# Patient Record
Sex: MALE | Race: OTHER | Employment: FULL TIME | URBAN - METROPOLITAN AREA
[De-identification: names, ages, dates, MRNs, and addresses within clinical notes are randomized per-mention and may not be internally consistent; named-entity substitution may affect disease eponyms.]

---

## 2017-02-23 ENCOUNTER — HOSPITAL ENCOUNTER (OUTPATIENT)
Dept: PHYSICAL THERAPY | Age: 37
Discharge: HOME | End: 2017-02-23
Payer: COMMERCIAL

## 2017-02-23 PROCEDURE — 97162 PT EVAL MOD COMPLEX 30 MIN: CPT | Performed by: PHYSICAL MEDICINE & REHABILITATION

## 2017-02-23 PROCEDURE — 97140 MANUAL THERAPY 1/> REGIONS: CPT | Performed by: PHYSICAL MEDICINE & REHABILITATION

## 2017-02-23 PROCEDURE — 97530 THERAPEUTIC ACTIVITIES: CPT | Performed by: PHYSICAL MEDICINE & REHABILITATION

## 2017-02-23 NOTE — PROGRESS NOTES
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 In  he developed chronic epidymidis following a variocele surgery, symptoms would wax and wane in severity over years. In  the testicular pain became constant. In  he began experiencing pelvic pain, penile pain and urinary symptoms. He has undergone urodynamic studies which he describes as \"the problem is not in the bladder but in the outlet at the sphincter and urethra\". He has been treated with several courses of antibiotics for prostatitis with no relief. He has tried Flomax with no relief, tibial nerve stimulation with no relief, he is considering sacral nerve stimulator implantation but wants to try physical therapy prior to making that decision. He is taking gabapentin with some relief. He uses cold compresses when pain is severe. He is seeing a chiropractor regularly for \"alignments\" but denies back pain. He reports he urinates once every 2-3 hours during the day, consistently once every 2 hours at night which is severely disruptive to his sleep.   The urge to urinate is very strong and will become painful at the bladder if he does not go immediately, once he urinates the bladder pain is relieved immediately, he has pain in the urethra during and after urination that is slow to resolve. He has intermittent, frequent pelvic floor and perineal pain that moves up into his lower abdomen. He describes intermittant R sided testicular pain and R upper thigh parasthesias and pain. He does not notice increased pain with valsalva or sit ups, he does feel his pain limits his ability to be physically active and exercise. Pain ranges from 0-7 VAS. PMH:   IBS (self diagnosed), has 3-4 bowel movements daily, level 6 has strong urge but no incontinence. HTN- meds  High cholsterol - meds  No LBP, no back, hip, LE surgery. Drinks european coffee 2-3x daily, has cut down but has noticed no difference. Notices no difference in urinary frequency with alcohol. Does not consume spicy, acidic foods. Color of his urine is dark yellow. PLOF: Chronic pain  Mechanism of Injury: Chronic  Previous Treatment/Compliance: Has not tried pelvic floor PT  PMHx/Surgical Hx: See above  Pt Goals: reduce pain   Barriers: Chronicity   Motivation: Good   Substance use: None reported    FABQ Score: 80  Cognition: A & O x 4       OBJECTIVE/EXAMINATION    Musculoskeletal:   L IC higher in standing, R shoulder higher (+) scoliosis    Trunk ROM:   Flexion:  Fingertips to knee joint line - stiff through lumbar spine, hamstrings  Extension:  WNL  Sidebending:  Stiff to L sidebending, Fingertips to knee joint line bilaterally. Tight through B hamstrings  L > R, piriformis, hip flexors, ITB's Globally high ms tone with poor flexibility. Pelvic Exam:   Pelvic floor musculature and associated hip/pelvic ms attachments non tender to palpation externally. Unable to assess levator, puborectalis, pelvic floor musculature internally due to very tight, reactive EAS. Multiple attempts to relax unsuccessful.   Unable to reproduce his chief complaint pain with muscular palpation externally however it was noted he has very high tone through EAS and pelvic floor, hips.    Further pelvic floor assessment indicated. 30 min Therapeutic Activity:  []  See flow sheet :  Discussed pelvic floor down training techniques, self trigger point release options, role and purpose of biofeedback assisted PFM quieting exercises. Discussed healthy bowel habits with recommendations to add soluble fiber (Bran Buds) and probiotics (Activia) to improve his stool quality from Level 6 to Level 4. Discussed bladder irritants and friends, recommended increased hydration to decrease the concentration of urine in the bladder, decrease caffeine intake. Discussed the importance of regular intentional nervous system quieting activities such as yoga, meditation, massage. Rationale: increase ROM and improve coordination  to improve the patients ability to decrease urinary frequency and pain. 15 min Manual Therapy:  Manual cues for pelvic floor bulging technique   Rationale: decrease pain, increase ROM and increase tissue extensibility  to improve the patients ability to tolerate pelvic floor exam.            With   [] TE   [x] TA   [] neuro   [] other: Patient Education: [x] Review HEP    [] Progressed/Changed HEP based on:   [] positioning   [] body mechanics   [] transfers   [] heat/ice application    [x] other: See above descripton       Other Objective/Functional Measures:  Further pelvic floor assessment is needed    Pain Level (0-10 scale) post treatment:  0/10      ASSESSMENT:   Pt presents with a long history of pelvic floor pain, urinary dysfunction, nocturia, penile pain. He is seeking pelvic floor PT in the hopes that working with the pelvic floor musculature will reduce his pain and improve his urinary symptoms. Today he presented with overall high muscular tone, very high pelvic ms tone to the point where complete exam was not possible. I certainly think we can help him with this.   I was not able to reproduce his chief complaint pain with the exam today however further exam is indicated. Of concern is his long history of very loose stools, 3-4 per day. We discussed that it is very difficult to hold watery stool, and this may be placing increased chronic demand on his pelvic floor contributing to his high tone. I have provided him some basic dietary recommendations for bulking his stool with the goal of getting him to a level 4 on the [facility] Stool Chart, if we see no results with these recommendations he may need further assessment by GI or dietary. We discussed the importance of intentional stress reduction activities to decrease muscle tone and quiet the nervous system. I am anxious to start a biofeedback assisted pelvic floor down training activity to see how he responds. Our plan is to continue pelvic floor PT 1-2 x per week for 4-6 weeks. Mr Steven Garcia will benefit from skilled PT services to address his functional limitations and achieve his functional goals as stated on the plan of care.          [x]  See Plan of James Garcia, PT, MSPT 2/23/2017  2:08 PM

## 2017-02-24 NOTE — PROGRESS NOTES
[address] [facility] Marilu Chin Breezy Cowanaide Hernández  Phone: [phone]  Fax: [phone]    Plan of Care/ Statement of Necessity for Physical Therapy Services 2-15    Patient name: Sachin Ramachandran  : 1980  Provider#: [id number]  Referral source: Yolette Dubon MD      Medical/Treatment Diagnosis: Pelvic floor dysfunction [N81.84]     Prior Hospitalization: see medical history     Comorbidities: HTN, High chol, pelvic pain  Prior Level of Function: Chronic  Medications: Verified on Patient Summary List    Start of Care: 17      Onset Date:       The [address] [place] [address] and following information is based on the information from the initial evaluation. 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 In  he developed chronic epidymidis following a variocele surgery, symptoms would wax and wane in severity over years. In  the testicular pain became constant. In  he began experiencing pelvic pain, penile pain and urinary symptoms. He has undergone urodynamic studies which he describes as \"the problem is not in the bladder but in the outlet at the sphincter and urethra\". He has been treated with several courses of antibiotics for prostatitis with no relief. He has tried Flomax with no relief, tibial nerve stimulation with no relief, he is considering sacral nerve stimulator implantation but wants to try physical therapy prior to making that decision. He is taking gabapentin with some relief. He uses cold compresses when pain is severe. He is seeing a chiropractor regularly for \"alignments\" but denies back pain. He reports he urinates once every 2-3 hours during the day, consistently once every 2 hours at night which is severely disruptive to his sleep.  The urge to urinate is very strong and will become painful at the bladder if he does not go immediately, once he urinates the bladder pain is relieved immediately, he has pain in the urethra during and after urination that is slow to resolve. He has intermittent, frequent pelvic floor and perineal pain that moves up into his lower abdomen. He describes intermittant R sided testicular pain and R upper thigh parasthesias and pain. He does not notice increased pain with valsalva or sit ups, he does feel his pain limits his ability to be physically active and exercise. Pain ranges from 0-7 VAS. Pt presents with a long history of pelvic floor pain, urinary dysfunction, nocturia, penile pain. He is seeking pelvic floor PT in the hopes that working with the pelvic floor musculature will reduce his pain and improve his urinary symptoms. Today he presented with overall high muscular tone, very high pelvic ms tone to the point where complete exam was not possible. I certainly think we can help him with this. I was not able to reproduce his chief complaint pain with the exam today however further exam is indicated. Of concern is his long history of very loose stools, 3-4 per day. We discussed that it is very difficult to hold watery stool, and this may be placing increased chronic demand on his pelvic floor contributing to his high tone. I have provided him some basic dietary recommendations for bulking his stool with the goal of getting him to a level 4 on the [facility] Stool Chart, if we see no results with these recommendations he may need further assessment by GI or dietary. We discussed the importance of intentional stress reduction activities to decrease muscle tone and quiet the nervous system. I am anxious to start a biofeedback assisted pelvic floor down training activity to see how he responds. Our plan is to continue pelvic floor PT 1-2 x per week for 4-6 weeks.       Evaluation Complexity History HIGH Complexity :3+ comorbidities / personal factors will impact the outcome/ POC ; Examination MEDIUM Complexity : 3 Standardized tests and measures addressing body structure, function, activity limitation and / or participation in recreation  ;Presentation MEDIUM Complexity : Evolving with changing characteristics  ; Clinical Decision Making MEDIUM Complexity : FOTO score of 26-74  Overall Complexity Rating: MEDIUM    Problem List: pain affecting function, decrease ROM, decrease ADL/ functional abilitiies, decrease activity tolerance and decrease flexibility/ joint mobility   Treatment Plan may include any combination of the following: Therapeutic exercise, Therapeutic activities, Neuromuscular re-education, Physical agent/modality, Manual therapy, Patient education and Self Care training  Patient / Family readiness to learn indicated by: asking questions  Persons(s) to be included in education: patient (P)  Barriers to Learning/Limitations: None  Patient Goal (s): no pain with activities, sleep a full night  Patient Self Reported Health Status: good  Rehabilitation Potential: good    Short Term Goals: To be accomplished in 6 weeks:  Patient will be independent with a progressive home exercise program  Patient will report improved stool quality from Level 6 to Level 4 in order to decrease demand on pelvic floor  Patient will tolerate full pelvic floor exam.     Long Term Goals: To be accomplished in 12 weeks:  Patient will have no pain with urination  Patient will have no radicular pain into R LE  Patient will decrease night urination to 2x per night  Further goals to be developed upon completion of pelvic exam     Frequency / Duration: Patient to be seen 1-2 times per week for 6-12 weeks.     Patient/ Caregiver education and instruction: self care and activity modification    [x]  Plan of care has been reviewed with CECE Goins PT, MSPT   2/24/2017 11:04 AM    ________________________________________________________________________    I certify that the above Therapy Services are being furnished while the patient is under my care. I agree with the treatment plan and certify that this therapy is necessary.     [de-identified] Signature:____________________  Date:____________Time: _________

## 2017-03-09 ENCOUNTER — HOSPITAL ENCOUNTER (OUTPATIENT)
Dept: PHYSICAL THERAPY | Age: 37
Discharge: HOME | End: 2017-03-09
Payer: COMMERCIAL

## 2017-03-09 PROCEDURE — 97110 THERAPEUTIC EXERCISES: CPT | Performed by: PHYSICAL MEDICINE & REHABILITATION

## 2017-03-09 PROCEDURE — 97140 MANUAL THERAPY 1/> REGIONS: CPT | Performed by: PHYSICAL MEDICINE & REHABILITATION

## 2017-03-09 PROCEDURE — 97112 NEUROMUSCULAR REEDUCATION: CPT | Performed by: PHYSICAL MEDICINE & REHABILITATION

## 2017-03-09 NOTE — PROGRESS NOTES
PT DAILY TREATMENT NOTE 2-15    Patient Name: Jessi Bui  Date:3/9/2017  : 1980  [x]  Patient  Verified  Payor: BLUE CROSS / Plan: [facility] PPO / Product Type: PPO /    In time: 10:00   Out time: 11:00  Total Treatment Time (min): 60  Visit #: 2     Treatment Area: Muscle weakness (generalized) [M62.81]    SUBJECTIVE  Pain Level (0-10 scale):  3/10  Any medication changes, allergies to medications, adverse drug reactions, diagnosis change, or new procedure performed?: [x] No    [] Yes (see summary sheet for update)  Subjective functional status/changes:   [] No changes reported  Has been using Bran Buds and Activia with improvement in stool quality, noticing less liquid stool. Still having BM 3x a day but with less urgency, better predictibility. He feels this is an improvement. OBJECTIVE    Tight through EAS/IAS, difficulty relaxing. High tone through levator ani bilaterally, multiple trigger points bilaterally L > R.    Chief complaint testicular and abdominal pain not reproduced with palpation to levator ani ms or area of pudendal nerve. PERFECT SCORE CHART  P =  Power (Laycock Scale Grade 0-5)  4/5  E =  Endurance (How long can pt hold max contraction)  6 seconds  R =  Repetitions (How many times the pt can repeat the holds) 5 reps  F =  Fast Twitch (Number of 1 second contractions in 10 seconds) 6 reps  E =  Elevation (Lifting of post vag wall toward pubic bone) Present/Absent   NA  C =  Co-Contraction of transverse abdominus Present/Absent  NT  T = Timing (squeeze and lift with cough) Present/Absent  NT    30 min Therapeutic Exercise:  [x] See flow sheet :   Rationale: increase ROM, improve coordination and increase proprioception to improve the patients ability to decrease urinary urge/frequency, decrease pelvic pain with ADLs. 15 min Neuromuscular Re-education:  []  See flow sheet :  Manual cues for  PFM and puborectalis downtraining techniques.      Rationale: increase ROM, improve coordination and increase proprioception  to improve the patients ability to actively downtrain PFM for pain relief, improved urinary function. 15 min Manual Therapy:  STM/TPR B levator ani group. 60-90 second holds. Rationale: decrease pain, increase tissue extensibility and decrease trigger points  to improve the patients ability to decrease pelvic pain with ADLs. With   [x] TE   [] TA   [x] neuro   [] other: Patient Education: [x] Review HEP    [x] Progressed/Changed HEP based on:   [] positioning   [] body mechanics   [] transfers   [] heat/ice application    [x] other: HEP provided, see copy in chart. Provided Therawand use and ordering information. Other Objective/Functional Measures: Demonstrated improved PFM resting tone post treatment today. Pain Level (0-10 scale) post treatment: 0/10    ASSESSMENT/Changes in Function:   High tone noted through pelvic floor, high tone at EAS/IAS. Unable to reproduce his chief complaint with palpation to pelvic floor. Vocalized good understanding of use of therawand including self trigger point release. Demonstrated independence with pelvic floor and hip stretching exercises. He plans to begin yoga practice for intentional relaxation, pelvic floor down training. Patient will continue to benefit from skilled PT services to modify and progress therapeutic interventions, address functional mobility deficits, address ROM deficits, address strength deficits, analyze and address soft tissue restrictions, analyze and cue movement patterns, analyze and modify body mechanics/ergonomics and assess and modify postural abnormalities to attain remaining goals. [x]  See Plan of Care  []  See progress note/recertification  []  See Discharge Summary         Progress towards goals / Updated goals:  Able to advance exercises today with good tolerance.   Pt continues to need instruction for correct exercise form and performance. Continues to demonstrate good potential to achieve functional goals stated on the plan of care. PLAN  [x]  Upgrade activities as tolerated     [x]  Continue plan of care  []  Update interventions per flow sheet       []  Discharge due to:_  [x]  Other: Biofeedback assisted PFM downtraining next visit.         Sanjuana Cueto, PT 3/9/2017  10:08 AM

## 2017-03-15 ENCOUNTER — HOSPITAL ENCOUNTER (OUTPATIENT)
Dept: PHYSICAL THERAPY | Age: 37
Discharge: HOME | End: 2017-03-15
Payer: COMMERCIAL

## 2017-03-15 PROCEDURE — 97112 NEUROMUSCULAR REEDUCATION: CPT | Performed by: PHYSICAL MEDICINE & REHABILITATION

## 2017-03-15 PROCEDURE — 97110 THERAPEUTIC EXERCISES: CPT | Performed by: PHYSICAL MEDICINE & REHABILITATION

## 2017-03-15 PROCEDURE — 97530 THERAPEUTIC ACTIVITIES: CPT | Performed by: PHYSICAL MEDICINE & REHABILITATION

## 2017-03-15 NOTE — PROGRESS NOTES
PT DAILY TREATMENT NOTE 2-15    Patient Name: Rodney Zuluaga  Date:3/15/2017  : 1980  [x]  Patient  Verified  Payor: BLUE ARCELIA / Plan: Steve Hollingsworth [id number] PPO / Product Type: PPO /    In time: 10:00  Out time: 11:00  Total Treatment Time (min): 60  Visit #: 3     Treatment Area: Muscle weakness (generalized) [M62.81]    SUBJECTIVE  Pain Level (0-10 scale): 3/10  Any medication changes, allergies to medications, adverse drug reactions, diagnosis change, or new procedure performed?: [x] No    [] Yes (see summary sheet for update)  Subjective functional status/changes:   [] No changes reported  Ordered therawand - waiting for it to arrive. He has noticed some decrease in pain over the past 3 days, possibly due to the stretching exercises  He is stretching every day. OBJECTIVE    15 min Therapeutic Exercise:  [] See flow sheet :  Pelvic floor, hip stretching   Rationale: increase ROM and improve coordination to improve the patients ability to decrease pelvic pain    15 min Therapeutic Activity:  []  See flow sheet :  Progressive relaxation, diaphragmatic breathing, pelvic floor down training activities. Rationale: improve coordination and increase proprioception  to improve the patients ability to decrease pelvic pain     30 min Neuromuscular Re-education:  []  See flow sheet : sEMG Biofeedback with rectal sensor for pelvic floor downtraining exercises. Incorporated diaphragmatic breathing, active pelvic floor bulging, contract relax and elevators.       Rationale: improve coordination, increase proprioception and decrease pain  to improve the patients ability to decrease pelvic pain and urinary hesitancy symptoms with ADLs             With   [x] TE   [x] TA   [] neuro   [] other: Patient Education: [x] Review HEP    [] Progressed/Changed HEP based on:   [] positioning   [] body mechanics   [] transfers   [] heat/ice application    [] other:      Other Objective/Functional Measures: See sEMG readout in chart. Measured 10W/10R. Averaged 5.9mV at rest.      Pain Level (0-10 scale) post treatment: 0/10      ASSESSMENT/Changes in Function:     Patient will continue to benefit from skilled PT services to modify and progress therapeutic interventions, address functional mobility deficits, address ROM deficits, address strength deficits, analyze and address soft tissue restrictions, analyze and cue movement patterns and analyze and modify body mechanics/ergonomics to attain remaining goals. [x]  See Plan of Care  []  See progress note/recertification  []  See Discharge Summary         Progress towards goals / Updated goals:  Able to advance exercises today with good tolerance. Pt continues to need instruction for correct exercise form and performance. Continues to demonstrate good potential to achieve functional goals stated on the plan of care.       PLAN  [x]  Upgrade activities as tolerated     []  Continue plan of care  []  Update interventions per flow sheet       []  Discharge due to:_  []  Other:_      Marko Verde PT, MSPT 3/15/2017  1:11 PM

## 2017-03-20 ENCOUNTER — HOSPITAL ENCOUNTER (OUTPATIENT)
Dept: PHYSICAL THERAPY | Age: 37
Discharge: HOME | End: 2017-03-20
Payer: COMMERCIAL

## 2017-03-20 PROCEDURE — 97014 ELECTRIC STIMULATION THERAPY: CPT | Performed by: PHYSICAL MEDICINE & REHABILITATION

## 2017-03-20 PROCEDURE — 97530 THERAPEUTIC ACTIVITIES: CPT | Performed by: PHYSICAL MEDICINE & REHABILITATION

## 2017-03-20 PROCEDURE — 97140 MANUAL THERAPY 1/> REGIONS: CPT | Performed by: PHYSICAL MEDICINE & REHABILITATION

## 2017-03-20 NOTE — PROGRESS NOTES
PT DAILY TREATMENT NOTE 2-15    Patient Name: Maddy Sims  Date:3/20/2017  : 1980  [x]  Patient  Verified  Payor: Jose Kumaret / Plan: Steve Hollingsworth [id number] PPO / Product Type: PPO /    In time: 1:30     Out time: 2:30     Total Treatment Time (min): 60  Visit #: 4     Treatment Area: Muscle weakness (generalized) [M62.81]    SUBJECTIVE  Pain Level (0-10 scale):  5/10  Any medication changes, allergies to medications, adverse drug reactions, diagnosis change, or new procedure performed?: [x] No    [] Yes (see summary sheet for update)  Subjective functional status/changes:   [] No changes reported  Symptoms are worse today, not clear why. He is having pelvic pain and diffuse radiating pain into B post and ant thighs. OBJECTIVE    Modality rationale: decrease pain and increase tissue extensibility to improve the patients ability to decrease pain with ADLs   Min Type Additional Details   15 [] Estim: []Att   [x]Unatt        []TENS instruct                  [x]IFC  []Premod   []NMES                     []Other:  []w/US   []w/ice   [x]w/heat  Position: supine hook lying  Location: pudendal nerve     []  Traction: [] Cervical       []Lumbar                       [] Prone          []Supine                       []Intermittent   []Continuous Lbs:  [] before manual  [] after manual  []w/heat    []  Ultrasound: []Continuous   [] Pulsed at:                            []1MHz   []3MHz Location:  W/cm2:    []  Paraffin         Location:  []w/heat    []  Ice     []  Heat  []  Ice massage Position:  Location:    []  Laser  []  Other: Position:  Location:    []  Vasopneumatic Device Pressure:       [] lo [] med [] hi   Temperature:    [x] Skin assessment post-treatment:  [x]intact []redness- no adverse reaction    []redness  adverse reaction:       15 min Therapeutic Activity:  []  See flow sheet : Therawand instruction/ TPR instruction. TENS discussion.    Rationale: increase ROM and improve pain control  to improve the patients ability to perform ADLs without pain    30 min Manual Therapy:  TPR to levator ani bilaterally, theils massage, pudental nerve release bilaterally    Rationale: decrease pain, increase ROM, increase tissue extensibility and decrease trigger points  to improve the patients ability to decrease pain      With   [] TE   [x] TA   [] neuro   [] other: Patient Education: [x] Review HEP    [] Progressed/Changed HEP based on:   [] positioning   [] body mechanics   [] transfers   [] heat/ice application    [] other:      Other Objective/Functional Measures: Improved tolerance to levator TPR     Pain Level (0-10 scale) post treatment: 4/10    ASSESSMENT/Changes in Function:   Considering TENS for home use for pain control. Patient will continue to benefit from skilled PT services to modify and progress therapeutic interventions, address functional mobility deficits, address ROM deficits, address strength deficits, analyze and address soft tissue restrictions, analyze and cue movement patterns and analyze and modify body mechanics/ergonomics to attain remaining goals. [x]  See Plan of Care  []  See progress note/recertification  []  See Discharge Summary         Progress towards goals / Updated goals:  Able to advance exercises today with good tolerance. Pt continues to need instruction for correct exercise form and performance. Continues to demonstrate good potential to achieve functional goals stated on the plan of care.       PLAN  [x]  Upgrade activities as tolerated     []  Continue plan of care  []  Update interventions per flow sheet       []  Discharge due to:_  []  Other:_      Jaclyn Ochoa PT, MSPT 3/20/2017  3:30 PM

## 2017-03-22 ENCOUNTER — APPOINTMENT (OUTPATIENT)
Dept: PHYSICAL THERAPY | Age: 37
End: 2017-03-22
Payer: COMMERCIAL

## 2017-03-27 ENCOUNTER — HOSPITAL ENCOUNTER (OUTPATIENT)
Dept: PHYSICAL THERAPY | Age: 37
Discharge: HOME | End: 2017-03-27
Payer: COMMERCIAL

## 2017-03-27 PROCEDURE — 97530 THERAPEUTIC ACTIVITIES: CPT | Performed by: PHYSICAL MEDICINE & REHABILITATION

## 2017-03-27 PROCEDURE — 97014 ELECTRIC STIMULATION THERAPY: CPT | Performed by: PHYSICAL MEDICINE & REHABILITATION

## 2017-03-27 NOTE — PROGRESS NOTES
PT DAILY TREATMENT NOTE 2-15    Patient Name: Mesha Sanderson  Date:3/27/2017  : 1980  [x]  Patient  Verified  Payor: BLUE CROSS / Plan: [employer] Hollings[employer] [id number] PPO / Product Type: PPO /    In time: 1:30  Out time: 2:30  Total Treatment Time (min): 60  Visit #: 5    Treatment Area: Muscle weakness (generalized) [M62.81]    SUBJECTIVE  Pain Level (0-10 scale): 4/10  Any medication changes, allergies to medications, adverse drug reactions, diagnosis change, or new procedure performed?: [x] No    [] Yes (see summary sheet for update)  Subjective functional status/changes:   [] No changes reported  Urinary frequency unchanged, still struggling with flow. Pain remains elevated.      OBJECTIVE    Modality rationale: decrease pain to improve the patients ability to decrease pelvic pain and frequency   Min Type Additional Details   45 [x] Estim: []Att   []Unatt        [x]TENS instruct                  []IFC  []Premod   []NMES                     [x]Other: Rectal TENS for pain relief  []w/US   []w/ice   []w/heat  Position: sidelying  Location: rectal    12.5 frequency continuous    []  Traction: [] Cervical       []Lumbar                       [] Prone          []Supine                       []Intermittent   []Continuous Lbs:  [] before manual  [] after manual  []w/heat    []  Ultrasound: []Continuous   [] Pulsed at:                            []1MHz   []3MHz Location:  W/cm2:    []  Paraffin         Location:  []w/heat    []  Ice     []  Heat  []  Ice massage Position:  Location:    []  Laser  []  Other: Position:  Location:    []  Vasopneumatic Device Pressure:       [] lo [] med [] hi   Temperature:    [x] Skin assessment post-treatment:  [x]intact []redness- no adverse reaction    []redness  adverse reaction:     15 min Therapeutic Activity:  []  See flow sheet :   Rationale: decrease pelvic pain  to improve the patients ability to decrease pain with urination       With   [] TE   [x] TA   [] neuro   [] other: Patient Education: [x] Review HEP    [] Progressed/Changed HEP based on:   [] positioning   [] body mechanics   [] transfers   [] heat/ice application    [x] other:  TENS trial in clinic. Other Objective/Functional Measures:  Poor tolerance to settings > 12.5 frequency     Pain Level (0-10 scale) post treatment: 2/10    ASSESSMENT/Changes in Function:    No significant improvement in the clinic with TENS today, multiple frequencies attempted. Will see back in 2 days and reassess. Patient will continue to benefit from skilled PT services to modify and progress therapeutic interventions, address functional mobility deficits, address ROM deficits, address strength deficits, analyze and address soft tissue restrictions, analyze and cue movement patterns and analyze and modify body mechanics/ergonomics to attain remaining goals.      [x]  See Plan of Care  []  See progress note/recertification  []  See Discharge Summary         Progress towards goals / Updated goals:  Limited progress    PLAN  [x]  Upgrade activities as tolerated     []  Continue plan of care  []  Update interventions per flow sheet       []  Discharge due to:_  []  Other:_      Derick Quinn PT, MSPT 3/27/2017  3:30 PM

## 2017-03-29 ENCOUNTER — HOSPITAL ENCOUNTER (OUTPATIENT)
Dept: PHYSICAL THERAPY | Age: 37
Discharge: HOME | End: 2017-03-29
Payer: COMMERCIAL

## 2017-03-29 PROCEDURE — 97112 NEUROMUSCULAR REEDUCATION: CPT | Performed by: PHYSICAL MEDICINE & REHABILITATION

## 2017-03-29 PROCEDURE — 97110 THERAPEUTIC EXERCISES: CPT | Performed by: PHYSICAL MEDICINE & REHABILITATION

## 2017-03-29 NOTE — PROGRESS NOTES
PT DAILY TREATMENT NOTE 2-15    Patient Name: Dominick Lopez  Date:3/29/2017  : 1980  [x]  Patient  Verified  Payor: BLUE CROSS / Plan: [employer]ings[employer] [id number] PPO / Product Type: PPO /    In time: 1:30    Out time: 2:30  Total Treatment Time (min): 60  Visit #: 6    Treatment Area: Muscle weakness (generalized) [M62.81]    SUBJECTIVE  Pain Level (0-10 scale): 4/10  Any medication changes, allergies to medications, adverse drug reactions, diagnosis change, or new procedure performed?: [x] No    [] Yes (see summary sheet for update)  Subjective functional status/changes:   [] No changes reported  Pain is unchanged, frequency is unchanged. Has to get up every 2 hours to urinate at night which is severely limiting his sleep.        OBJECTIVE    Modality rationale: decrease pain and increase tissue extensibility to improve the patients ability to decrease pelvic pain   Min Type Additional Details   45 [x] Estim: []Att   []Unatt        []TENS instruct                  []IFC  []Premod   []NMES                     [x]Other:  TENS assessment, instruction []w/US   []w/ice   []w/heat  Position: sidelying  Location: rectal    []  Traction: [] Cervical       []Lumbar                       [] Prone          []Supine                       []Intermittent   []Continuous Lbs:  [] before manual  [] after manual  []w/heat    []  Ultrasound: []Continuous   [] Pulsed at:                            []1MHz   []3MHz Location:  W/cm2:    []  Paraffin         Location:  []w/heat    []  Ice     []  Heat  []  Ice massage Position:  Location:    []  Laser  []  Other: Position:  Location:    []  Vasopneumatic Device Pressure:       [] lo [] med [] hi   Temperature:    [x] Skin assessment post-treatment:  [x]intact []redness- no adverse reaction    []redness  adverse reaction:       15 min Therapeutic Activity:  [x]  See flow sheet :  PFM downtraining   Rationale: increase ROM and promote PFM relaxation  to improve the patients ability to decrease PFM pain             With   [] TE   [x] TA   [] neuro   [] other: Patient Education: [x] Review HEP    [] Progressed/Changed HEP based on:   [] positioning   [] body mechanics   [] transfers   [] heat/ice application    [x] other:  TENS assessment     Other Objective/Functional Measures: --     Pain Level (0-10 scale) post treatment: 3/10    ASSESSMENT/Changes in Function:     Patient will continue to benefit from skilled PT services to modify and progress therapeutic interventions, address functional mobility deficits, analyze and address soft tissue restrictions, analyze and cue movement patterns, analyze and modify body mechanics/ergonomics and assess and modify postural abnormalities to attain remaining goals. [x]  See Plan of Care  []  See progress note/recertification  []  See Discharge Summary         Progress towards goals / Updated goals:  Able to advance exercises today with good tolerance. Pt continues to need instruction for correct exercise form and performance. Continues to demonstrate good potential to achieve functional goals stated on the plan of care.       PLAN  [x]  Upgrade activities as tolerated     []  Continue plan of care  []  Update interventions per flow sheet       []  Discharge due to:_  []  Other:_      Chad Raines, PT, MSPT 3/29/2017  1:43 PM

## 2017-05-01 ENCOUNTER — HOSPITAL ENCOUNTER (OUTPATIENT)
Dept: PHYSICAL THERAPY | Age: 37
Discharge: HOME | End: 2017-05-01
Payer: COMMERCIAL

## 2017-05-01 PROCEDURE — 97140 MANUAL THERAPY 1/> REGIONS: CPT | Performed by: PHYSICAL MEDICINE & REHABILITATION

## 2017-05-01 PROCEDURE — 97530 THERAPEUTIC ACTIVITIES: CPT | Performed by: PHYSICAL MEDICINE & REHABILITATION

## 2017-05-01 NOTE — PROGRESS NOTES
PT DAILY TREATMENT NOTE 2-15    Patient Name: Luisito Gomez  Date:2017  : 1980  [x]  Patient  Verified  Payor: BLUE CROSS / Plan: [facility] PPO / Product Type: PPO /    In time: 2:30   Out time: 3:30  Total Treatment Time (min): 60  Visit #: 6    Treatment Area: Pelvic muscle wasting [N81.84]    SUBJECTIVE  Pain Level (0-10 scale): 0/10  Any medication changes, allergies to medications, adverse drug reactions, diagnosis change, or new procedure performed?: [x] No    [] Yes (see summary sheet for update)  Subjective functional status/changes:   [] No changes reported  Pain is improved, he has been doing down training exercises with some relief. OBJECTIVE    30 min Therapeutic Activity:  []  See flow sheet :  PFM downtraining   Rationale: increase ROM, improve coordination and increase proprioception  to improve the patients ability to decrease urinary hesitancy and frequency    30 min Manual Therapy:  PFM B levator, IC STM/TPR. Bladder neck release   Rationale: decrease pain and increase tissue extensibility  to improve the patients ability to decrase urinary hesitancy and frequency    With   [] TE   [x] TA   [] neuro   [] other: Patient Education: [x] Review HEP    [] Progressed/Changed HEP based on:   [] positioning   [] body mechanics   [] transfers   [] heat/ice application    [] other:      Other Objective/Functional Measures:  Note improved downtraining ability with less ms tone at EAS and levator     Pain Level (0-10 scale) post treatment: 0/10    ASSESSMENT/Changes in Function:     Patient will continue to benefit from skilled PT services to modify and progress therapeutic interventions, address ROM deficits, analyze and address soft tissue restrictions, analyze and cue movement patterns, analyze and modify body mechanics/ergonomics and assess and modify postural abnormalities to attain remaining goals.      [x]  See Plan of Care  []  See progress note/recertification  [] See Discharge Summary         Progress towards goals / Updated goals:  Able to advance exercises today with good tolerance. Pt continues to need instruction for correct exercise form and performance. Continues to demonstrate good potential to achieve functional goals stated on the plan of care.       PLAN  [x]  Upgrade activities as tolerated     []  Continue plan of care  []  Update interventions per flow sheet       []  Discharge due to:_  []  Other:_      Randy Alegre PT, MSPT 5/1/2017  4:53 PM

## 2017-05-02 ENCOUNTER — APPOINTMENT (OUTPATIENT)
Dept: PHYSICAL THERAPY | Age: 37
End: 2017-05-02
Payer: COMMERCIAL

## 2017-05-08 ENCOUNTER — HOSPITAL ENCOUNTER (OUTPATIENT)
Dept: PHYSICAL THERAPY | Age: 37
Discharge: HOME | End: 2017-05-08
Payer: COMMERCIAL

## 2017-05-08 PROCEDURE — 97014 ELECTRIC STIMULATION THERAPY: CPT | Performed by: PHYSICAL MEDICINE & REHABILITATION

## 2017-05-08 PROCEDURE — 97530 THERAPEUTIC ACTIVITIES: CPT | Performed by: PHYSICAL MEDICINE & REHABILITATION

## 2017-05-08 NOTE — PROGRESS NOTES
PT DAILY TREATMENT NOTE 2-15    Patient Name: Arnav Pichardo  Date:2017  : 1980  [x]  Patient  Verified  Payor: BLUE CROSS / Plan: [employer] [id number] PPO / Product Type: PPO /    In time: 12:30   Out time: 1:30  Total Treatment Time (min): 60  Visit #: 9    Treatment Area: Muscle weakness (generalized) [M62.81]    SUBJECTIVE  Pain Level (0-10 scale): 3/10  Any medication changes, allergies to medications, adverse drug reactions, diagnosis change, or new procedure performed?: [x] No    [] Yes (see summary sheet for update)  Subjective functional status/changes:   [x] No changes reported      OBJECTIVE    Modality rationale: decrease pain and increase tissue extensibility to improve the patients ability to decrease pain, improve flow   Min Type Additional Details   15 [x] Estim: []Att   []Unatt        []TENS instruct                  []IFC  [x]Premod   []NMES                     []Other:  []w/US   []w/ice   []w/heat  Position: sidelying  Location: rectal    []  Traction: [] Cervical       []Lumbar                       [] Prone          []Supine                       []Intermittent   []Continuous Lbs:  [] before manual  [] after manual  []w/heat    []  Ultrasound: []Continuous   [] Pulsed at:                            []1MHz   []3MHz Location:  W/cm2:    []  Paraffin         Location:  []w/heat    []  Ice     []  Heat  []  Ice massage Position:  Location:    []  Laser  []  Other: Position:  Location:    []  Vasopneumatic Device Pressure:       [] lo [] med [] hi   Temperature:    [x] Skin assessment post-treatment:  [x]intact []redness- no adverse reaction    []redness  adverse reaction:       45 min Therapeutic Activity:  [x]  See flow sheet :  HEP review, dietary review, TENS assessment   Rationale: increase ROM, improve coordination and increase proprioception  to improve the patients ability to decrease pain, decrease hesitancy             With   [x] TE   [x] TA   [] neuro   [] other: Patient Education: [x] Review HEP    [] Progressed/Changed HEP based on:   [] positioning   [] body mechanics   [] transfers   [] heat/ice application    [] other:      Other Objective/Functional Measures: --     Pain Level (0-10 scale) post treatment: 3/10   No significant improvement with TENS and exs. ASSESSMENT/Changes in Function:     Patient will continue to benefit from skilled PT services to modify and progress therapeutic interventions, address ROM deficits, analyze and address soft tissue restrictions, analyze and cue movement patterns, analyze and modify body mechanics/ergonomics and assess and modify postural abnormalities to attain remaining goals. [x]  See Plan of Care  []  See progress note/recertification  []  See Discharge Summary         Progress towards goals / Updated goals:  Pt continues to need instruction for correct exercise form and performance. Continues to demonstrate good potential to achieve functional goals stated on the plan of care.       PLAN  [x]  Upgrade activities as tolerated     []  Continue plan of care  []  Update interventions per flow sheet       []  Discharge due to:_  []  Other:_      Sandi De Jesus PT, MSPT 5/8/2017  3:24 PM

## 2017-05-10 ENCOUNTER — APPOINTMENT (OUTPATIENT)
Dept: PHYSICAL THERAPY | Age: 37
End: 2017-05-10
Payer: COMMERCIAL

## 2017-05-15 ENCOUNTER — HOSPITAL ENCOUNTER (OUTPATIENT)
Dept: PHYSICAL THERAPY | Age: 37
Discharge: HOME | End: 2017-05-15
Payer: COMMERCIAL

## 2017-05-15 PROCEDURE — 97530 THERAPEUTIC ACTIVITIES: CPT | Performed by: PHYSICAL MEDICINE & REHABILITATION

## 2017-05-15 PROCEDURE — 97110 THERAPEUTIC EXERCISES: CPT | Performed by: PHYSICAL MEDICINE & REHABILITATION

## 2017-05-15 PROCEDURE — 97032 APPL MODALITY 1+ESTIM EA 15: CPT | Performed by: PHYSICAL MEDICINE & REHABILITATION

## 2017-05-15 PROCEDURE — 97112 NEUROMUSCULAR REEDUCATION: CPT | Performed by: PHYSICAL MEDICINE & REHABILITATION

## 2017-05-15 NOTE — PROGRESS NOTES
PT DAILY TREATMENT NOTE 2-15    Patient Name: Rosalia Burns  Date:5/15/2017  : 1980  [x]  Patient  Verified  Payor: BLUE CROSS / Plan: [employer] [id number] PPO / Product Type: PPO /    In time: 9:00   Out time: 10:00  Total Treatment Time (min): 60  Visit #: 9     Treatment Area: Muscle weakness (generalized) [M62.81]    SUBJECTIVE  Pain Level (0-10 scale): 2/10  Any medication changes, allergies to medications, adverse drug reactions, diagnosis change, or new procedure performed?: [x] No    [] Yes (see summary sheet for update)  Subjective functional status/changes:   [] No changes reported  Had a bad weekend, up frequently at night to urinate, has been having pain with urination and testicular pain R > L. Overall feels like he has had no improvement in pain and symptoms. Had 2 sessions of manual therapy with another pelvic floor PT, no improvement. Had 1 session of dry needling (low back, glut, hip) with no improvement. Noticed no improvement with TENS following last visit.       OBJECTIVE    Modality rationale: decrease pain and increase tissue extensibility to improve the patients ability to decrease pelvic pain, decrease frequency   Min Type Additional Details   15 [x] Estim: []Att   []Unatt        []TENS instruct                  []IFC  []Premod   []NMES                     [x]Other:  TENS - rectal 50pps []w/US   []w/ice   []w/heat  Position: L sidelying  Location: Rectal/levator    []  Traction: [] Cervical       []Lumbar                       [] Prone          []Supine                       []Intermittent   []Continuous Lbs:  [] before manual  [] after manual  []w/heat    []  Ultrasound: []Continuous   [] Pulsed at:                            []1MHz   []3MHz Location:  W/cm2:    []  Paraffin         Location:  []w/heat    []  Ice     []  Heat  []  Ice massage Position:  Location:    []  Laser  []  Other: Position:  Location:    []  Vasopneumatic Device Pressure:       [] lo [] med [] hi Temperature:    [x] Skin assessment post-treatment:  [x]intact []redness- no adverse reaction    []redness  adverse reaction:     15 min Therapeutic Activity:  []  See flow sheet :   Rationale: improve coordination and increase proprioception  to improve the patients ability to decrease pain and frequency     15 min Neuromuscular Re-education:  []  See flow sheet :  PFM downtraining with manual cues   Rationale: improve coordination and increase proprioception  to improve the patients ability to decrease pain, decrease frequency    15 min Manual Therapy:  STM, TPR to B levator ani- focused anterio-lateral borders. Contract relax technique. Manual stretch levator Neris. Rationale: decrease pain, increase ROM, increase tissue extensibility and decrease trigger points  to improve the patients ability to decrease pelvic pain. With   [] TE   [x] TA   [x] neuro   [] other: Patient Education: [x] Review HEP    [] Progressed/Changed HEP based on:   [] positioning   [] body mechanics   [] transfers   [] heat/ice application    [x] other: Therawand instruction. Other Objective/Functional Measures: Palpable increased ms turgor - possible scarring R levator ani lateral anterior border. (+) trigger point with reproduction of pain. Overall hypertonic however improved ability to relax and drop pelvis on command. Pain Level (0-10 scale) post treatment: 1/10  \"a little sore\"    ASSESSMENT/Changes in Function:     Patient will continue to benefit from skilled PT services to modify and progress therapeutic interventions, address ROM deficits, analyze and address soft tissue restrictions and analyze and cue movement patterns to attain remaining goals. [x]  See Plan of Care  []  See progress note/recertification  []  See Discharge Summary         Progress towards goals / Updated goals:  Pt continues to need instruction for correct exercise form and performance.   Continues to demonstrate good potential to achieve functional goals stated on the plan of care.       PLAN  [x]  Upgrade activities as tolerated     []  Continue plan of care  []  Update interventions per flow sheet       []  Discharge due to:_  []  Other:_      Sandi De Jesus PT, MSPT 5/15/2017  9:10 AM

## 2017-05-22 ENCOUNTER — HOSPITAL ENCOUNTER (OUTPATIENT)
Dept: PHYSICAL THERAPY | Age: 37
Discharge: HOME | End: 2017-05-22
Payer: COMMERCIAL

## 2017-05-22 PROCEDURE — 97530 THERAPEUTIC ACTIVITIES: CPT | Performed by: PHYSICAL MEDICINE & REHABILITATION

## 2017-05-22 PROCEDURE — 90901 BIOFEEDBACK TRAIN ANY METH: CPT | Performed by: PHYSICAL MEDICINE & REHABILITATION

## 2017-05-22 PROCEDURE — 97110 THERAPEUTIC EXERCISES: CPT | Performed by: PHYSICAL MEDICINE & REHABILITATION

## 2017-05-22 NOTE — PROGRESS NOTES
PT DAILY TREATMENT NOTE 2-15    Patient Name: Cody Vines  Date:2017  : 1980  [x]  Patient  Verified  Payor: BLUE CROSS / Plan: [employer]ings[employer] [id number] PPO / Product Type: PPO /    In time: 2:30   Out time: 3:30   Total Treatment Time (min): 60  Visit #: 10    Treatment Area: Muscle weakness (generalized) [M62.81]    SUBJECTIVE  Pain Level (0-10 scale):  310  Any medication changes, allergies to medications, adverse drug reactions, diagnosis change, or new procedure performed?: [x] No    [] Yes (see summary sheet for update)  Subjective functional status/changes:   [] No changes reported  No significant symptom change after last visit. Has been having some rectal pain.        OBJECTIVE    Modality rationale: increase muscle contraction/control to improve the patients ability to decrease urinary hesitancy, improve flow, decrease pain    Min Type Additional Details   15 [x] Estim: []Att   []Unatt        []TENS instruct                  []IFC  []Premod   []NMES                     [x]Other: sEMG biofeedback   []w/US   []w/ice   []w/heat  Position: L sidelying, standing   Location: rectal     []  Traction: [] Cervical       []Lumbar                       [] Prone          []Supine                       []Intermittent   []Continuous Lbs:  [] before manual  [] after manual  []w/heat    []  Ultrasound: []Continuous   [] Pulsed at:                            []1MHz   []3MHz Location:  W/cm2:    []  Paraffin         Location:  []w/heat    []  Ice     []  Heat  []  Ice massage Position:  Location:    []  Laser  []  Other: Position:  Location:    []  Vasopneumatic Device Pressure:       [] lo [] med [] hi   Temperature:    [x] Skin assessment post-treatment:  [x]intact []redness- no adverse reaction    []redness  adverse reaction:     30 min Therapeutic Exercise:  [] See flow sheet :  PFM downtraining    Rationale: increase ROM, improve coordination and increase proprioception to improve the patients ability to decrease pelvic pain, improve flow, decrease hesitancy. 15 min Therapeutic Activity:  []  See flow sheet :   Rationale: increase ROM, improve coordination and increase proprioception  to improve the patients ability to improve flow           With   [x] TE   [x] TA   [] neuro   [] other: Patient Education: [x] Review HEP    [] Progressed/Changed HEP based on:   [] positioning   [] body mechanics   [] transfers   [] heat/ice application    [] other:      Other Objective/Functional Measures:  See sEMG biofeedback readout, able to decrese resting tone to average 3.4mV, significant improvement from initial evaluation. In standing able to     Pain Level (0-10 scale) post treatment: 0/10    ASSESSMENT/Changes in Function:     Patient will continue to benefit from skilled PT services to modify and progress therapeutic interventions, analyze and address soft tissue restrictions, analyze and cue movement patterns and analyze and modify body mechanics/ergonomics to attain remaining goals. [x]  See Plan of Care  []  See progress note/recertification  []  See Discharge Summary         Progress towards goals / Updated goals:  Able to advance exercises today with good tolerance. Pt continues to need instruction for correct exercise form and performance. Continues to demonstrate good potential to achieve functional goals stated on the plan of care.       PLAN  [x]  Upgrade activities as tolerated     []  Continue plan of care  []  Update interventions per flow sheet       []  Discharge due to:_  []  Other:_      Angel Massey, PT, MSPT 5/22/2017  3:08 PM

## 2017-06-05 ENCOUNTER — APPOINTMENT (OUTPATIENT)
Dept: PHYSICAL THERAPY | Age: 37
End: 2017-06-05

## 2017-06-19 ENCOUNTER — APPOINTMENT (OUTPATIENT)
Dept: PHYSICAL THERAPY | Age: 37
End: 2017-06-19

## 2017-06-26 ENCOUNTER — APPOINTMENT (OUTPATIENT)
Dept: PHYSICAL THERAPY | Age: 37
End: 2017-06-26

## 2017-07-06 NOTE — PROGRESS NOTES
[facility]  [address] [facility] Marilu Chin Silva Cordon Obed  Phone: [phone]  Fax: [phone]    Discharge Summary  2-15    Patient name: Skylar Kelly  : 1980  Provider#: [id number]  Referral source: Hue Ying MD      Medical/Treatment Diagnosis: Muscle weakness (generalized) [M62.81]     Prior Hospitalization: see medical history     Comorbidities: See Plan of Care  Prior Level of Function:See Plan of Care  Medications: Verified on Patient Summary List    Start of Care: 17      Onset Date:    Visits from Start of Care: 10     Missed Visits: 3  Reporting Period : 17 to 17      ASSESSMENT/SUMMARY OF CARE: Mr. Casi Arce was referred for evaluation and treatment of pelvic and abdominal pain, disuria, nocturia. He was referred to PT prior to trial of spine stimulator for his chronic pelvic floor complaints. Treatment consisted of manual therapy, therapeutic exercise, therapeutic activity, bladder health education, bowel habit education, biofeedback assisted pelvic floor down training, home exercise program development and progression, trial pelvic floor TENS, instruction in pelvic floor thera-wand self trigger point release, modalities for pain. Mr. Casi Arce made minimal gains with PT efforts. His daytime urination and  nocturia remained very frequent, once an hour or more severely interrupting his sleep. intermediate through his admission Mr. Casi Arce began seeing another pelvic floor physical therapist who specialized in a dry needling approach to pelvic floor dysfunction. On last conversation he was not receiving any benefit from this however he and his therapist were increasing the intensity of treatment in hopes of better results. Mr Casi Arce demonstrated good compliance with his exercise and self trigger point release program.  He did not comply with dietary recommendations to reduce/eliminate bladder irritants.   Of concern is his high consumption of caffeine products. It may benefit him to wean off caffeine to assess the effect on his urinary frequency and nocturia prior to surgical intervention. Mr Cayla Escalante made good progress with increased awareness of pelvic floor muscle tension. He demonstrated significant improvements in his ability to relax and down train his pelvic floor muscles. Initially he was intolerant of any rectal pelvic floor exam, he was able to progress to tolerating exam and trigger point release and demonstrated good relaxation of PFM muscles. Unfortunately pelvic floor trigger point release and relaxation did not decrease his symptoms. Short Term Goals: To be accomplished in 6 weeks:  Patient will be independent with a progressive home exercise program  MET  Patient will report improved stool quality from Level 6 to Level 4 in order to decrease demand on pelvic floor  MET  Patient will tolerate full pelvic floor exam.  MET      Long Term Goals:  To be accomplished in 12 weeks:  Patient will have no pain with urination  NOT MET  Patient will have no radicular pain into R LE  NOT MET  Patient will decrease night urination to 2x per night  NOT MET    RECOMMENDATIONS:  [x]Discontinue therapy: []Patient has reached or is progressing toward set goals      []Patient is non-compliant or has abdicated      [x]Due to lack of appreciable progress towards set goals      []Other    Pippa Desir PT, MSPT    7/6/2017 9:08 AM

## 2021-09-23 ENCOUNTER — HOSPITAL ENCOUNTER (EMERGENCY)
Age: 41
Discharge: HOME | End: 2021-09-23
Attending: EMERGENCY MEDICINE
Payer: COMMERCIAL

## 2021-09-23 VITALS
RESPIRATION RATE: 18 BRPM | TEMPERATURE: 97.7 F | SYSTOLIC BLOOD PRESSURE: 132 MMHG | OXYGEN SATURATION: 98 % | DIASTOLIC BLOOD PRESSURE: 70 MMHG | HEART RATE: 61 BPM

## 2021-09-23 DIAGNOSIS — R10.2 CHRONIC PELVIC PAIN IN MALE: Primary | ICD-10-CM

## 2021-09-23 DIAGNOSIS — G89.29 CHRONIC PELVIC PAIN IN MALE: Primary | ICD-10-CM

## 2021-09-23 LAB
APPEARANCE UR: CLEAR
BACTERIA URNS QL MICRO: NEGATIVE /HPF
BILIRUB UR QL: NEGATIVE
COLOR UR: ABNORMAL
EPITH CASTS URNS QL MICRO: ABNORMAL /LPF
GLUCOSE UR STRIP.AUTO-MCNC: NEGATIVE MG/DL
HGB UR QL STRIP: NEGATIVE
HYALINE CASTS URNS QL MICRO: ABNORMAL /LPF (ref 0–5)
KETONES UR QL STRIP.AUTO: ABNORMAL MG/DL
LEUKOCYTE ESTERASE UR QL STRIP.AUTO: ABNORMAL
NITRITE UR QL STRIP.AUTO: NEGATIVE
PH UR STRIP: 7.5 [PH] (ref 5–8)
PROT UR STRIP-MCNC: 30 MG/DL
RBC #/AREA URNS HPF: ABNORMAL /HPF (ref 0–5)
SP GR UR REFRACTOMETRY: 1.01
UR CULT HOLD, URHOLD: NORMAL
UROBILINOGEN UR QL STRIP.AUTO: 1 EU/DL (ref 0.2–1)
WBC URNS QL MICRO: ABNORMAL /HPF (ref 0–4)

## 2021-09-23 PROCEDURE — 74011250636 HC RX REV CODE- 250/636: Performed by: EMERGENCY MEDICINE

## 2021-09-23 PROCEDURE — 81001 URINALYSIS AUTO W/SCOPE: CPT

## 2021-09-23 PROCEDURE — 99284 EMERGENCY DEPT VISIT MOD MDM: CPT

## 2021-09-23 PROCEDURE — 96372 THER/PROPH/DIAG INJ SC/IM: CPT

## 2021-09-23 PROCEDURE — 74011250637 HC RX REV CODE- 250/637: Performed by: EMERGENCY MEDICINE

## 2021-09-23 RX ORDER — CYCLOBENZAPRINE HCL 10 MG
10 TABLET ORAL
Status: COMPLETED | OUTPATIENT
Start: 2021-09-23 | End: 2021-09-23

## 2021-09-23 RX ORDER — HYDROCODONE BITARTRATE AND ACETAMINOPHEN 5; 325 MG/1; MG/1
1 TABLET ORAL ONCE
Status: COMPLETED | OUTPATIENT
Start: 2021-09-23 | End: 2021-09-23

## 2021-09-23 RX ORDER — KETOROLAC TROMETHAMINE 10 MG/1
10 TABLET, FILM COATED ORAL
Qty: 10 TABLET | Refills: 0 | Status: SHIPPED | OUTPATIENT
Start: 2021-09-23

## 2021-09-23 RX ORDER — CYCLOBENZAPRINE HCL 10 MG
10 TABLET ORAL
Qty: 10 TABLET | Refills: 0 | Status: SHIPPED | OUTPATIENT
Start: 2021-09-23

## 2021-09-23 RX ORDER — KETOROLAC TROMETHAMINE 30 MG/ML
30 INJECTION, SOLUTION INTRAMUSCULAR; INTRAVENOUS ONCE
Status: COMPLETED | OUTPATIENT
Start: 2021-09-23 | End: 2021-09-23

## 2021-09-23 RX ORDER — HYDROCODONE BITARTRATE AND ACETAMINOPHEN 5; 325 MG/1; MG/1
1 TABLET ORAL
Qty: 6 TABLET | Refills: 0 | Status: SHIPPED | OUTPATIENT
Start: 2021-09-23 | End: 2021-09-26

## 2021-09-23 RX ADMIN — KETOROLAC TROMETHAMINE 30 MG: 30 INJECTION, SOLUTION INTRAMUSCULAR at 12:46

## 2021-09-23 RX ADMIN — CYCLOBENZAPRINE 10 MG: 10 TABLET, FILM COATED ORAL at 11:37

## 2021-09-23 RX ADMIN — HYDROCODONE BITARTRATE AND ACETAMINOPHEN 1 TABLET: 5; 325 TABLET ORAL at 11:37

## 2021-09-23 NOTE — ED PROVIDER NOTES
49-year-old male with past medical history significant for chronic pelvic urethral pain/chronic prostatitis presents with complaints of burning urethral pain x4 days. Patient reports he cannot sleep with the pain. Is been prescribed lidocaine gel and tramadol by his primary care physician and pain continues. Patient reports pain for many years intermittent and it generally gets better with rest and relaxation. Has been attributed to mental health conditions and was set to start antidepressant 4 days ago but was postponed secondary to the increasing urethral pain. Denies hematuria. Denies frequency, urgency. Denies abdominal pain. Denies trauma. Denies fever, chills, nausea, vomiting, chest pain, shortness of breath. Patient reports he has seen urologist at [facility] in the past and has an appointment in 1 month. Denies tobacco use, drug use, alcohol use           No past medical history on file. No past surgical history on file. No family history on file. Social History     Socioeconomic History    Marital status: UNKNOWN     Spouse name: Not on file    Number of children: Not on file    Years of education: Not on file    Highest education level: Not on file   Occupational History    Not on file   Tobacco Use    Smoking status: Not on file   Substance and Sexual Activity    Alcohol use: Not on file    Drug use: Not on file    Sexual activity: Not on file   Other Topics Concern    Not on file   Social History Narrative    Not on file     Social Determinants of Health     Financial Resource Strain:     Difficulty of Paying Living Expenses:    Food Insecurity:     Worried About Running Out of Food in the Last Year:     [address] in the Last Year:    Transportation Needs:     Lack of Transportation (Medical):      Lack of Transportation (Non-Medical):    Physical Activity:     Days of Exercise per Week:     Minutes of Exercise per Session:    Stress:     Feeling of Stress : Social Connections:     Frequency of Communication with Friends and Family:     Frequency of Social Gatherings with Friends and Family:     Attends Yazidism Services:     Active Member of Clubs or Organizations:     Attends Club or Organization Meetings:     Marital Status:    Intimate Partner Violence:     Fear of Current or Ex-Partner:     Emotionally Abused:     Physically Abused:     Sexually Abused: ALLERGIES: Patient has no known allergies. Review of Systems   Constitutional: Negative for chills and fever. HENT: Negative for congestion, nosebleeds and sore throat. Eyes: Negative for pain and discharge. Respiratory: Negative for cough and shortness of breath. Cardiovascular: Negative for chest pain and palpitations. Gastrointestinal: Negative for abdominal pain, constipation, nausea and vomiting. Genitourinary: Positive for dysuria and penile pain. Negative for decreased urine volume, flank pain and urgency. Musculoskeletal: Negative for gait problem and myalgias. Skin: Negative for rash and wound. Neurological: Negative for seizures and syncope. Hematological: Does not bruise/bleed easily. Psychiatric/Behavioral: Negative for confusion, self-injury and suicidal ideas. Vitals:    09/23/21 0916   BP: (!) 180/98   Pulse: 70   Resp: 18   Temp: 97.7 °F (36.5 °C)   SpO2: 100%            Physical Exam  Vitals and nursing note reviewed. Constitutional:       Appearance: He is well-developed. HENT:      Head: Normocephalic and atraumatic. Eyes:      Pupils: Pupils are equal, round, and reactive to light. Cardiovascular:      Rate and Rhythm: Normal rate and regular rhythm. Heart sounds: Normal heart sounds. Pulmonary:      Effort: Pulmonary effort is normal. No respiratory distress. Breath sounds: Normal breath sounds. No wheezing. Abdominal:      General: Bowel sounds are normal.      Palpations: Abdomen is soft. Tenderness:  There is no abdominal tenderness. There is no guarding or rebound. Genitourinary:     Penis: Normal.       Testes: Normal.   Musculoskeletal:         General: Normal range of motion. Cervical back: Normal range of motion and neck supple. Skin:     General: Skin is warm and dry. Neurological:      Mental Status: He is alert and oriented to person, place, and time. Psychiatric:         Behavior: Behavior normal.          MDM  Number of Diagnoses or Management Options  Diagnosis management comments: 38-year-old male with chronic urethral pelvic pain syndrome presents with complaints of increased pain x4 days. Patient is well-appearing, no acute distress, hemodynamically stable, no drainage or discharge noted. Plan-pain control, UA         Procedures        1:08 PM  Patient's results have been reviewed with them. Patient and/or family have verbally conveyed their understanding and agreement of the patient's signs, symptoms, diagnosis, treatment and prognosis and additionally agree to follow up as recommended or return to the Emergency Room should their condition change prior to follow-up. Discharge instructions have also been provided to the patient with some educational information regarding their diagnosis as well a list of reasons why they would want to return to the ER prior to their follow-up appointment should their condition change.

## 2021-09-23 NOTE — ED NOTES
Patient (s)  given copy of dc instructions and 2 script(s). Patient (s)  verbalized understanding of instructions and script (s). Patient given a current medication reconciliation form and verbalized understanding of their medications. Patient (s) verbalized understanding of the importance of discussing medications with  his or her physician or clinic they will be following up with. Patient alert and oriented and in no acute distress. Pt refusing to leave as he wants stronger pain medication. Dr Barton Payment notified.

## 2021-09-23 NOTE — ED TRIAGE NOTES
Patient reports having urologic chronic pelvic pain syndrome. Patient reports pain and burning in his urethra and states the pain is so bad that he cannot sleep. Patient's PCP prescribed tramadol and the medication is not helping him sleep.